# Patient Record
(demographics unavailable — no encounter records)

---

## 2024-12-23 NOTE — HISTORY OF PRESENT ILLNESS
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 Patient reports that her chronic GERD is well controlled with Nexium 20 mg once daily x 15 years. She takes Pepcid as needed at bedtime depending on what she eats. She admits to eating foods that triggers her symptoms. For her constipation, she takes Colace and Metamucil. She reports that at times she takes loperamide for diarrhea. She admits to consuming dairy products including lots of cheese. She also states that her anxiety contributes to worsening IBS symptoms. Mrs. Sexton denies ever having a lower GI evaluation before. She denies any family history of colon cancer or polyps. She denies any change in bowel habits. She denies any rectal bleeding or melena. She denies any abdominal pain, unintentional weight loss or history of anemia.

## 2024-12-23 NOTE — ASSESSMENT
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 Patient reports that her chronic GERD is well controlled with Nexium 20 mg once daily x 15 years, however for the past 5 years her GERD symptoms has been very minimal. She takes Pepcid as needed at bedtime depending on what she eats. She admits to eating foods that triggers her symptoms. For her constipation, she takes Colace and Metamucil. She reports that at times she takes loperamide for diarrhea. She admits to consuming dairy products including lots of cheese. She also states that her anxiety contributes to worsening IBS symptoms. Mrs. Sexton denies ever having a lower GI evaluation before. She denies any family history of colon cancer or polyps. She denies any change in bowel habits. She denies any rectal bleeding or melena. She denies any abdominal pain, unintentional weight loss or history of anemia.   I recommend a screening colonoscopy.  I explained to her the risks, alternatives, and benefits to a colonoscopy. Risk including but not limited to bleeding, perforation, infection and adverse medication reaction. I informed her that I will be sending a prescription for colon prep Suprep to the pharmacy.  Discussed with her diet and colon prep instructions prior to the procedure. Questions were answered. She stated understanding to the above and is agreeable to proceed with the planned procedure. Patient advised to call back the office if Suprep is not covered by her insurance so that an alternative can be prescribed, or with any further questions or concerns.  For her IBS, I recommend a trial of dairy-free diet for two weeks to identify trigger foods that may be causing worsening diarrhea. I recommend that she drink plenty of water and increase fiber in her diet to prevent constipation.   For her chronic GERD, I recommend that she eats smaller meals. I recommend that she eat dinner at least 2-3 hours before going to bed. She is to elevate her head when lying down. Discussed with her weaning off Nexium., however she states that she tried in the past and was unsuccessful. She reports that her symptoms return. Discussed with her long tern side effects of PPI, including but not limited to bone loss and osteoporosis. She states understanding, however she will continue with Nexium 20 mg daily and famotidine as needed at bedtime.

## 2024-12-23 NOTE — PHYSICAL EXAM
[Alert] : alert [Normal Voice/Communication] : normal voice/communication [Healthy Appearing] : healthy appearing [No Acute Distress] : no acute distress [No Respiratory Distress] : no respiratory distress [No Acc Muscle Use] : no accessory muscle use [Respiration, Rhythm And Depth] : normal respiratory rhythm and effort [Auscultation Breath Sounds / Voice Sounds] : lungs were clear to auscultation bilaterally [Heart Rate And Rhythm] : heart rate was normal and rhythm regular [Normal S1, S2] : normal S1 and S2 [Murmurs] : no murmurs [Bowel Sounds] : normal bowel sounds [Abdomen Tenderness] : non-tender [No Masses] : no abdominal mass palpated [Abdomen Soft] : soft [] : no hepatosplenomegaly [Oriented To Time, Place, And Person] : oriented to person, place, and time

## 2025-03-19 NOTE — ADDENDUM
[FreeTextEntry1] : Documented by Justina Gaitan acting as a scribe for Dr. Phillips. 03/19/2025   All medical record entries made by the scribe were at my, Dr. Phillips, direction and personally dictated by me on 03/19/2025. I have reviewed the chart an agree that the record accurately reflects my personal performance of the history, physical exam, assessment and plan. I have also personally directed, reviewed, and agreed with the chart.

## 2025-03-19 NOTE — PLAN
[FreeTextEntry1] : Follow up   Diarrhea  we'll check stool culture and gluten tolerance  Start bland diet and increase po fluids Referred to GI  Pt to follow up in one week or sooner if Sx persist or worsen.  Fatigue we'll check TSH/T4 today  Depression  cont. Lexapro 20mg daily  cont. lorazepam 0.5mg prn  follows with psychiatry   Current smoker smoking cessation discussed with Pt   Bloodwork ordered.  Follow up in one week via Telehealth for lab results.

## 2025-03-19 NOTE — PHYSICAL EXAM
[No Acute Distress] : no acute distress [Well Nourished] : well nourished [Well Developed] : well developed [Well-Appearing] : well-appearing [Normal Sclera/Conjunctiva] : normal sclera/conjunctiva [Normal Outer Ear/Nose] : the outer ears and nose were normal in appearance [Normal Oropharynx] : the oropharynx was normal [No JVD] : no jugular venous distention [No Lymphadenopathy] : no lymphadenopathy [Supple] : supple [No Respiratory Distress] : no respiratory distress  [No Accessory Muscle Use] : no accessory muscle use [Clear to Auscultation] : lungs were clear to auscultation bilaterally [Normal Rate] : normal rate  [Regular Rhythm] : with a regular rhythm [Normal S1, S2] : normal S1 and S2 [Pedal Pulses Present] : the pedal pulses are present [No Edema] : there was no peripheral edema [No Extremity Clubbing/Cyanosis] : no extremity clubbing/cyanosis [Soft] : abdomen soft [Non Tender] : non-tender [Non-distended] : non-distended [Normal Posterior Cervical Nodes] : no posterior cervical lymphadenopathy [Normal Anterior Cervical Nodes] : no anterior cervical lymphadenopathy [No CVA Tenderness] : no CVA  tenderness [No Spinal Tenderness] : no spinal tenderness [No Joint Swelling] : no joint swelling [Grossly Normal Strength/Tone] : grossly normal strength/tone [No Rash] : no rash [Coordination Grossly Intact] : coordination grossly intact [No Focal Deficits] : no focal deficits [Normal Gait] : normal gait [Normal Affect] : the affect was normal [Normal Insight/Judgement] : insight and judgment were intact

## 2025-03-19 NOTE — HISTORY OF PRESENT ILLNESS
[de-identified] : Ms. ALBERTO VELASCO is a 45 year old female with Hx of anal fissure s/p surgery 8/16/24, hemorrhoids, and anxiety/depression, presenting for a follow up.   Pt c/o diarrhea, fatigue, abdominal bloating, and gas that has been ongoing for 3 months. She has been treating with Imodium and Pepto Bismol and has not followed with GI yet. She states OTC meds have improved diarrhea but still experiences moderate abdominal bloating.  She reports she has been eating healthy and starchy foods. Colonoscopy 2/28/25 revealed a few polyps and she was told to repeat in 5 years. Denies any SOB, CP.   Pt reports increased depression. She is on Lexapro, Ativan prn, and is following with psychiatry.  Pt admits she is smoking 5-10 cigarettes daily.  Reports compliance with taking her meds daily.

## 2025-03-31 NOTE — HISTORY OF PRESENT ILLNESS
[de-identified] : Ms. ALBERTO VELASCO is a 45 year old female with Hx of anal fissure s/p surgery 8/16/24, hemorrhoids, and anxiety/depression, presenting for an acute visit.   Pt c/o cold runny nose, chest congestion, productive cough with clear sputum, and low-grade fever/chills that started 4 days ago. She has not tested herself for COVID.  Pt reports she has not followed with GI yet for recent loose bowel movements.  Denies any SOB, CP, abdominal pain.

## 2025-03-31 NOTE — HISTORY OF PRESENT ILLNESS
[de-identified] : Ms. ALBERTO VELASCO is a 45 year old female with Hx of anal fissure s/p surgery 8/16/24, hemorrhoids, and anxiety/depression, presenting for an acute visit.   Pt c/o cold runny nose, chest congestion, productive cough with clear sputum, and low-grade fever/chills that started 4 days ago. She has not tested herself for COVID.  Pt reports she has not followed with GI yet for recent loose bowel movements.  Denies any SOB, CP, abdominal pain.

## 2025-03-31 NOTE — PHYSICAL EXAM
[No Acute Distress] : no acute distress [Well Nourished] : well nourished [Well Developed] : well developed [Well-Appearing] : well-appearing [Normal Sclera/Conjunctiva] : normal sclera/conjunctiva [Normal Outer Ear/Nose] : the outer ears and nose were normal in appearance [No JVD] : no jugular venous distention [No Lymphadenopathy] : no lymphadenopathy [Supple] : supple [No Respiratory Distress] : no respiratory distress  [No Accessory Muscle Use] : no accessory muscle use [Clear to Auscultation] : lungs were clear to auscultation bilaterally [Normal Rate] : normal rate  [Regular Rhythm] : with a regular rhythm [Normal S1, S2] : normal S1 and S2 [Pedal Pulses Present] : the pedal pulses are present [No Edema] : there was no peripheral edema [No Extremity Clubbing/Cyanosis] : no extremity clubbing/cyanosis [Soft] : abdomen soft [Non Tender] : non-tender [Non-distended] : non-distended [Normal Posterior Cervical Nodes] : no posterior cervical lymphadenopathy [Normal Anterior Cervical Nodes] : no anterior cervical lymphadenopathy [No CVA Tenderness] : no CVA  tenderness [No Spinal Tenderness] : no spinal tenderness [No Joint Swelling] : no joint swelling [Grossly Normal Strength/Tone] : grossly normal strength/tone [No Rash] : no rash [Coordination Grossly Intact] : coordination grossly intact [No Focal Deficits] : no focal deficits [Normal Gait] : normal gait [Normal Affect] : the affect was normal [Normal Insight/Judgement] : insight and judgment were intact [de-identified] : + erythematous pharynx

## 2025-03-31 NOTE — ADDENDUM
[FreeTextEntry1] : Documented by Justina Gaitan acting as a scribe for Dr. Phillips. 03/25/2025   All medical record entries made by the scribe were at my, Dr. Phillips, direction and personally dictated by me on 03/25/2025. I have reviewed the chart an agree that the record accurately reflects my personal performance of the history, physical exam, assessment and plan. I have also personally directed, reviewed, and agreed with the chart.

## 2025-03-31 NOTE — PHYSICAL EXAM
[No Acute Distress] : no acute distress [Well Nourished] : well nourished [Well Developed] : well developed [Well-Appearing] : well-appearing [Normal Sclera/Conjunctiva] : normal sclera/conjunctiva [Normal Outer Ear/Nose] : the outer ears and nose were normal in appearance [No JVD] : no jugular venous distention [No Lymphadenopathy] : no lymphadenopathy [Supple] : supple [No Respiratory Distress] : no respiratory distress  [No Accessory Muscle Use] : no accessory muscle use [Clear to Auscultation] : lungs were clear to auscultation bilaterally [Normal Rate] : normal rate  [Regular Rhythm] : with a regular rhythm [Normal S1, S2] : normal S1 and S2 [Pedal Pulses Present] : the pedal pulses are present [No Edema] : there was no peripheral edema [No Extremity Clubbing/Cyanosis] : no extremity clubbing/cyanosis [Soft] : abdomen soft [Non Tender] : non-tender [Non-distended] : non-distended [Normal Posterior Cervical Nodes] : no posterior cervical lymphadenopathy [Normal Anterior Cervical Nodes] : no anterior cervical lymphadenopathy [No CVA Tenderness] : no CVA  tenderness [No Spinal Tenderness] : no spinal tenderness [No Joint Swelling] : no joint swelling [Grossly Normal Strength/Tone] : grossly normal strength/tone [No Rash] : no rash [Coordination Grossly Intact] : coordination grossly intact [No Focal Deficits] : no focal deficits [Normal Gait] : normal gait [Normal Affect] : the affect was normal [Normal Insight/Judgement] : insight and judgment were intact [de-identified] : + erythematous pharynx

## 2025-03-31 NOTE — PLAN
[FreeTextEntry1] : Acute visit  URI  Rapid Flu: negative  start Zpack as directed  Start Mometasone Furoate 50mcg Nasal suspension; 2 sprays into each nostril daily start Promethazine syrup PRN  start Tylenol, saltwater gargles, and increase po fluids  avoid sick contact  Pt to follow up in one week or sooner if Sx persist or worsen.  Diarrhea  diet d/w pt  GI f/u   Depression  cont. Lexapro 20mg daily  cont. lorazepam 0.5mg prn  follows with psychiatry   Current smoker smoking cessation discussed with Pt

## 2025-04-01 NOTE — REVIEW OF SYSTEMS
[Chest Pain] : chest pain [Palpitations] : palpitations [Cough] : cough [Shortness Of Breath] : no shortness of breath [Abdominal Pain] : no abdominal pain [Nausea] : no nausea [Vomiting] : no vomiting [Heartburn] : no heartburn [Negative] : Cardiovascular [FreeTextEntry7] : + loose BM [FreeTextEntry9] : rib pain

## 2025-04-01 NOTE — HISTORY OF PRESENT ILLNESS
[de-identified] : Ms. ALBERTO VELASCO is a 45-year-old female with Hx of  anal fissure s/p surgery 8/16/24, hemorrhoids, and anxiety/depression, presenting for a follow up.   Pt was last seen 3/25/25 for an acute visit and was prescribed a Z-pack and Promethazine for an acute URI. She states she finished the antibiotics 4 days ago and is feeling much better. Pt reports she continues to have a dry cough accompanied by pain in her ribs from coughing.    She denies fever, chills, SOB, N/V/D, or abdominal pain.

## 2025-04-01 NOTE — PLAN
[FreeTextEntry1] : Acute visit  Dry Cough  start Promethazine syrup PRN  Pt declines benzonatate, will use OTC Robitussin Referred for Chest Xray if cough persists   Diarrhea  GI f/u   Depression / Anxiety cont. Lexapro 20mg daily  cont. lorazepam 0.5mg prn  follows with psychiatry   Current smoker smoking cessation discussed with Pt

## 2025-04-01 NOTE — ADDENDUM
[FreeTextEntry1] : Documented by Justina Gaitan acting as a scribe for Dr. Phillips. 04/01/2025   All medical record entries made by the scribe were at my, Dr. Phillips, direction and personally dictated by me on 04/01/2025. I have reviewed the chart an agree that the record accurately reflects my personal performance of the history, physical exam, assessment and plan. I have also personally directed, reviewed, and agreed with the chart.

## 2025-04-01 NOTE — HISTORY OF PRESENT ILLNESS
[de-identified] : Ms. ALBERTO VELASCO is a 45-year-old female with Hx of  anal fissure s/p surgery 8/16/24, hemorrhoids, and anxiety/depression, presenting for a follow up.   Pt was last seen 3/25/25 for an acute visit and was prescribed a Z-pack and Promethazine for an acute URI. She states she finished the antibiotics 4 days ago and is feeling much better. Pt reports she continues to have a dry cough accompanied by pain in her ribs from coughing.    She denies fever, chills, SOB, N/V/D, or abdominal pain.

## 2025-04-01 NOTE — PHYSICAL EXAM
[No Acute Distress] : no acute distress [Well Nourished] : well nourished [Well Developed] : well developed [Well-Appearing] : well-appearing [Normal Sclera/Conjunctiva] : normal sclera/conjunctiva [Normal Outer Ear/Nose] : the outer ears and nose were normal in appearance [Normal Oropharynx] : the oropharynx was normal [No JVD] : no jugular venous distention [No Lymphadenopathy] : no lymphadenopathy [Supple] : supple [No Respiratory Distress] : no respiratory distress  [No Accessory Muscle Use] : no accessory muscle use [Clear to Auscultation] : lungs were clear to auscultation bilaterally [Normal Rate] : normal rate  [Regular Rhythm] : with a regular rhythm [Normal S1, S2] : normal S1 and S2 [Pedal Pulses Present] : the pedal pulses are present [No Edema] : there was no peripheral edema [No Extremity Clubbing/Cyanosis] : no extremity clubbing/cyanosis [Soft] : abdomen soft [Non Tender] : non-tender [Non-distended] : non-distended [Normal Posterior Cervical Nodes] : no posterior cervical lymphadenopathy [Normal Anterior Cervical Nodes] : no anterior cervical lymphadenopathy [No CVA Tenderness] : no CVA  tenderness [No Spinal Tenderness] : no spinal tenderness [No Joint Swelling] : no joint swelling [Grossly Normal Strength/Tone] : grossly normal strength/tone [No Rash] : no rash [Coordination Grossly Intact] : coordination grossly intact [No Focal Deficits] : no focal deficits [Normal Gait] : normal gait [Normal Affect] : the affect was normal [Normal Insight/Judgement] : insight and judgment were intact [EOMI] : extraocular movements intact [Normal TMs] : both tympanic membranes were normal